# Patient Record
(demographics unavailable — no encounter records)

---

## 2025-02-14 NOTE — HISTORY OF PRESENT ILLNESS
[FreeTextEntry1] : Will be having follow up with his new PCP in the upcoming weeks and will discuss management for seizures and possible refills for phenobarbital. Will also have repeat fasting labs performed with PCP and have results faxed to our office.   Tolerating current cardiac medical regimen without difficulty including Isosorbide Mononitrate 30 mg QD, Norvasc 5 mg QD, ASA 81 mg QD, Atorvastatin 20 mg take 2 tabs QHS, Furosemide 40 mg QD, Potassium 10 mEQ QD, Losartan 50 mg QD, Metoprolol Succinate 50 mg QD.      Carotid Doppler (6/27/2024):  There was mild atherosclerotic plaquing in LETY (1-19%) and mild to moderate atherosclerotic plaquing in LICA (20-49%).  The left and right vertebral arteries demonstrate antegrade flow.    Transthoracic Echocardiogram (6/27/2024):  The LV cavity is normal in size and wall thickness.  The LV systolic and diastolic function is normal with LVEF 55 to 60%.  There are no regional wall motion abnormalities.  Normal RV cavity size and wall thickness with overall normal RV systolic function.  The left atrium is mildly dilated and right atrium is moderately dilated.  Mild to moderate MR and [place].  Mild AR and TR.  Aortic root is dilated at 4.30 cm, and ascending aorta dilated at 3.60 cm.  No pericardial effusion seen.

## 2025-02-14 NOTE — REASON FOR VISIT
[FreeTextEntry1] : The patient is a very pleasant 82-year-old gentleman who came to our office May 2024 for a cardiac consultation after moving to  after leaving [place] where he had been residing previously.  He does have a significant cardiac history and was followed by Cardiologist (Dr. Abby Mckeon) from [facility] in [facility], up until recently.   He apparently has a history of undergoing open-heart surgery back in 2015 in [facility].  It is still not clear whether he had a combination of coronary artery disease and valvular heart disease.    Patient is back today with his niece for a general cardiac checkup.    He continues to report feeling overall good and denies CP, SOB, orthopnea, PND, palpitations, presyncope, syncope, edema.  There has been some exertional dyspnea if he really pushes himself lately.

## 2025-02-14 NOTE — REASON FOR VISIT
[FreeTextEntry1] : The patient is a very pleasant 82-year-old gentleman who came to our office May 2024 for a cardiac consultation after moving to  after leaving [place] where he had been residing previously.  He does have a significant cardiac history and was followed by Cardiologist (Dr. Abby Mckeon) from [facility] in [facility], up until recently.   He apparently has a history of undergoing open-heart surgery back in 2015 in [facility].  It is still not clear whether he had a combination of coronary artery disease and valvular heart disease.    Patient is back today with his niece for a general cardiac checkup.    He continues to report feeling overall good and denies CP, SOB, orthopnea, PND, palpitations, presyncope, syncope, edema.  There has been some exertional dyspnea if he really pushes himself lately.

## 2025-02-14 NOTE — PHYSICAL EXAM
[Well Developed] : well developed [Well Nourished] : well nourished [No Acute Distress] : no acute distress [Normal Conjunctiva] : normal conjunctiva [Normal Venous Pressure] : normal venous pressure [Carotid Bruit] : carotid bruit [Normal S1, S2] : normal S1, S2 [No Rub] : no rub [No Gallop] : no gallop [Murmur] : murmur [Clear Lung Fields] : clear lung fields [Good Air Entry] : good air entry [No Respiratory Distress] : no respiratory distress  [Soft] : abdomen soft [Non Tender] : non-tender [No Masses/organomegaly] : no masses/organomegaly [Normal Bowel Sounds] : normal bowel sounds [Normal Gait] : normal gait [No Edema] : no edema [No Cyanosis] : no cyanosis [No Clubbing] : no clubbing [No Varicosities] : no varicosities [No Rash] : no rash [No Skin Lesions] : no skin lesions [Moves all extremities] : moves all extremities [No Focal Deficits] : no focal deficits [Normal Speech] : normal speech [Alert and Oriented] : alert and oriented [Normal memory] : normal memory [de-identified] : left [de-identified] : Grade I to II/VI systolic murmur [de-identified] : well-healed sternotomy scar midline chest

## 2025-02-14 NOTE — ASSESSMENT
[FreeTextEntry1] : EKG today reveals sinus bradycardia, rate 55 bpm, first degree AV block, voltage criteria for LVH, early R wave transition V1 to V2.  Essentially unchanged.   In summary, 82-year-old gentleman who came to our office last May for a cardiac consultation after moving to  after leaving [place] where he had been residing previously.  He does have a significant cardiac history and was followed by Cardiologist (Dr. Abby Mckeon) from [facility] in [facility], up until recently.     He apparently has a history of undergoing open-heart surgery back in 2015 in [facility].  It is still not clear whether he had a combination of coronary artery disease and valvular heart disease.    Patient is back today with his niece for a general cardiac checkup.    He continues to report feeling overall good and denies CP, SOB, orthopnea, PND, palpitations, presyncope, syncope, edema.  There has been some exertional dyspnea if he really pushes himself lately.   Will be having follow up with his new PCP in the upcoming weeks and will discuss management for seizures and possible refills for phenobarbital. Will also have repeat fasting labs performed with PCP and have results faxed to our office.   Tolerating current cardiac medical regimen without difficulty including Isosorbide Mononitrate 30 mg QD, Norvasc 5 mg QD, ASA 81 mg QD, Atorvastatin 20 mg take 2 tabs QHS, Furosemide 40 mg QD, Potassium 10 mEQ QD, Losartan 50 mg QD, Metoprolol Succinate 50 mg QD.      Carotid Doppler (6/27/2024):  There was mild atherosclerotic plaquing in LETY (1-19%) and mild to moderate atherosclerotic plaquing in LICA (20-49%).  The left and right vertebral arteries demonstrate antegrade flow.    Transthoracic Echocardiogram (6/27/2024):  The LV cavity is normal in size and wall thickness.  The LV systolic and diastolic function is normal with LVEF 55 to 60%.  There are no regional wall motion abnormalities.  Normal RV cavity size and wall thickness with overall normal RV systolic function.  The left atrium is mildly dilated and right atrium is moderately dilated.  Mild to moderate MR and [place].  Mild AR and TR.  Aortic root is dilated at 4.30 cm, and ascending aorta dilated at 3.60 cm.  No pericardial effusion seen.    PLAN:  1).  Patient is to complete a Transthoracic Echocardiogram and Carotid Doppler study prior to follow up to assess overall cardiac function and valvulopathy as well as to assess extent of carotid plaquing stenosis respectively.   2).  Continue current cardiac medications.    3).  Diet and lifestyle modification discussed including low sodium, low fat and low carbohydrate weight reducing diet.  Patient is to implement aerobic exercise regimen few days per week as tolerated.   4).  Follow up with PCP (Dr. Collado) regarding routine checkups and fasting blood work including lipid panel.  Forward all testing/lab work to our office.   5).  We will again attempt to contact his prior Caridologist (Dr. Mckeon) in hopes of obtaining prior medical records and reports about his prior cardiac surgeries.    6).  Recommend patient report any untoward symptoms. No additional cardiac testing indicated at this time.   7).  Follow up with Dr. Gamboa within 6 months or PRN.

## 2025-02-14 NOTE — PHYSICAL EXAM
[Well Developed] : well developed [Well Nourished] : well nourished [No Acute Distress] : no acute distress [Normal Conjunctiva] : normal conjunctiva [Normal Venous Pressure] : normal venous pressure [Carotid Bruit] : carotid bruit [Normal S1, S2] : normal S1, S2 [No Rub] : no rub [No Gallop] : no gallop [Murmur] : murmur [Clear Lung Fields] : clear lung fields [Good Air Entry] : good air entry [No Respiratory Distress] : no respiratory distress  [Soft] : abdomen soft [Non Tender] : non-tender [No Masses/organomegaly] : no masses/organomegaly [Normal Bowel Sounds] : normal bowel sounds [Normal Gait] : normal gait [No Edema] : no edema [No Cyanosis] : no cyanosis [No Clubbing] : no clubbing [No Varicosities] : no varicosities [No Rash] : no rash [No Skin Lesions] : no skin lesions [Moves all extremities] : moves all extremities [No Focal Deficits] : no focal deficits [Normal Speech] : normal speech [Alert and Oriented] : alert and oriented [Normal memory] : normal memory [de-identified] : left [de-identified] : Grade I to II/VI systolic murmur [de-identified] : well-healed sternotomy scar midline chest

## 2025-02-14 NOTE — HISTORY OF PRESENT ILLNESS
[FreeTextEntry1] : Will be having follow up with his new PCP in the upcoming weeks and will discuss management for seizures and possible refills for phenobarbital. Will also have repeat fasting labs performed with PCP and have results faxed to our office.   Tolerating current cardiac medical regimen without difficulty including Isosorbide Mononitrate 30 mg QD, Norvasc 5 mg QD, ASA 81 mg QD, Atorvastatin 20 mg take 2 tabs QHS, Furosemide 40 mg QD, Potassium 10 mEQ QD, Losartan 50 mg QD, Metoprolol Succinate 50 mg QD.      Carotid Doppler (6/27/2024):  There was mild atherosclerotic plaquing in LETY (1-19%) and mild to moderate atherosclerotic plaquing in LICA (20-49%).  The left and right vertebral arteries demonstrate antegrade flow.    Transthoracic Echocardiogram (6/27/2024):  The LV cavity is normal in size and wall thickness.  The LV systolic and diastolic function is normal with LVEF 55 to 60%.  There are no regional wall motion abnormalities.  Normal RV cavity size and wall thickness with overall normal RV systolic function.  The left atrium is mildly dilated and right atrium is moderately dilated.  Mild to moderate MR and [place].  Mild AR and TR.  Aortic root is dilated at 4.30 cm, and ascending aorta dilated at 3.60 cm.  No pericardial effusion seen.

## 2025-02-14 NOTE — ASSESSMENT
[FreeTextEntry1] : EKG today reveals sinus bradycardia, rate 55 bpm, first degree AV block, voltage criteria for LVH, early R wave transition V1 to V2.  Essentially unchanged.   In summary, 82-year-old gentleman who came to our office last May for a cardiac consultation after moving to  after leaving [place] where he had been residing previously.  He does have a significant cardiac history and was [facility] up until recently.     He apparently has a history of undergoing open-heart surgery back in 2015 in [facility].  It is still not clear whether he had a combination of coronary artery disease and valvular heart disease.    Patient is back today with his niece for a general cardiac checkup.    He continues to report feeling overall good and denies CP, SOB, orthopnea, PND, palpitations, presyncope, syncope, edema.  There has been some exertional dyspnea if he really pushes himself lately.   Will be having follow up with his new PCP in the upcoming weeks and will discuss management for seizures and possible refills for phenobarbital. Will also have repeat fasting labs performed with PCP and have results faxed to our office.   Tolerating current cardiac medical regimen without difficulty including Isosorbide Mononitrate 30 mg QD, Norvasc 5 mg QD, ASA 81 mg QD, Atorvastatin 20 mg take 2 tabs QHS, Furosemide 40 mg QD, Potassium 10 mEQ QD, Losartan 50 mg QD, Metoprolol Succinate 50 mg QD.      Carotid Doppler (6/27/2024):  There was mild atherosclerotic plaquing in LETY (1-19%) and mild to moderate atherosclerotic plaquing in LICA (20-49%).  The left and right vertebral arteries demonstrate antegrade flow.    Transthoracic Echocardiogram (6/27/2024):  The LV cavity is normal in size and wall thickness.  The LV systolic and diastolic function is normal with LVEF 55 to 60%.  There are no regional wall motion abnormalities.  Normal RV cavity size and wall thickness with overall normal RV systolic function.  The left atrium is mildly dilated and right atrium is moderately dilated.  Mild to moderate MR and [place].  Mild AR and TR.  Aortic root is dilated at 4.30 cm, and ascending aorta dilated at 3.60 cm.  No pericardial effusion seen.    PLAN:  1).  Patient is to complete a Transthoracic Echocardiogram and Carotid Doppler study prior to follow up to assess overall cardiac function and valvulopathy as well as to assess extent of carotid plaquing stenosis respectively.   2).  Continue current cardiac medications.    3).  Diet and lifestyle modification discussed including low sodium, low fat and low carbohydrate weight reducing diet.  Patient is to implement aerobic exercise regimen few days per week as tolerated.   4).  Follow up with PCP (Dr. Collado) regarding routine checkups and fasting blood work including lipid panel.  Forward all testing/lab work to our office.   5).  We will again attempt to contact his prior Caridologist (Dr. Mckeon) in hopes of obtaining prior medical records and reports about his prior cardiac surgeries.    6).  Recommend patient report any untoward symptoms. No additional cardiac testing indicated at this time.   7).  Follow up with Dr. Gamboa within 6 months or PRN.